# Patient Record
Sex: MALE | Race: BLACK OR AFRICAN AMERICAN | NOT HISPANIC OR LATINO
[De-identification: names, ages, dates, MRNs, and addresses within clinical notes are randomized per-mention and may not be internally consistent; named-entity substitution may affect disease eponyms.]

---

## 2019-10-22 ENCOUNTER — TRANSCRIPTION ENCOUNTER (OUTPATIENT)
Age: 39
End: 2019-10-22

## 2019-10-24 ENCOUNTER — APPOINTMENT (OUTPATIENT)
Dept: ORTHOPEDIC SURGERY | Facility: CLINIC | Age: 39
End: 2019-10-24
Payer: COMMERCIAL

## 2019-10-24 VITALS
DIASTOLIC BLOOD PRESSURE: 86 MMHG | HEIGHT: 70 IN | BODY MASS INDEX: 31.5 KG/M2 | WEIGHT: 220 LBS | HEART RATE: 65 BPM | SYSTOLIC BLOOD PRESSURE: 126 MMHG

## 2019-10-24 DIAGNOSIS — Z98.890 OTHER SPECIFIED POSTPROCEDURAL STATES: ICD-10-CM

## 2019-10-24 DIAGNOSIS — M75.21 BICIPITAL TENDINITIS, RIGHT SHOULDER: ICD-10-CM

## 2019-10-24 DIAGNOSIS — M75.81 OTHER SHOULDER LESIONS, RIGHT SHOULDER: ICD-10-CM

## 2019-10-24 DIAGNOSIS — M89.511 OSTEOLYSIS, RIGHT SHOULDER: ICD-10-CM

## 2019-10-24 PROCEDURE — 73030 X-RAY EXAM OF SHOULDER: CPT | Mod: RT

## 2019-10-24 PROCEDURE — 99203 OFFICE O/P NEW LOW 30 MIN: CPT

## 2019-10-24 RX ORDER — MELOXICAM 7.5 MG/1
7.5 TABLET ORAL
Qty: 21 | Refills: 0 | Status: COMPLETED | COMMUNITY
Start: 2019-10-24 | End: 2019-11-14

## 2019-10-24 NOTE — DISCUSSION/SUMMARY
[de-identified] : DAGO is a 39 year male who presents today with complaints of right shoulder pain x 1 month. Prior to this he has had no pain but does admit to right shoulder surgery by unknown doctor for impingement 7 years ago. At that time he did PT but hasn't done any since then. Over the last 2 weeks he has rested and not performed any gym activities, 2 weeks prior when the pain started there was no known injury with workouts at the gym but he modified his activities due to pain. He denies use of pain medication or injection for this issue. He was seen by 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 A prescription of Meloxicam x 21 days was prescribed and should be taken as directed with food to prevent GI upset, if occurs pt to D/C and call us at that time. Pt to perform PT as well 2x/week x 4-6 weeks and until we see him again. At that time we will see him for clinical reassessment. Pt agrees to the above plan and all questions were answered.\par

## 2019-10-24 NOTE — HISTORY OF PRESENT ILLNESS
[de-identified] : DGAO is a 39 year male who presents today with complaints of right shoulder pain x 1 month. Prior to this he has had no pain but does admit to right shoulder surgery by unknown doctor for impingement 7 years ago. At that time he did PT but hasn't done any since then. Over the last 2 weeks he has rested and not performed any gym activities, 2 weeks prior when the pain started there was no known injury with workouts at the gym but he modified his activities due to pain. He denies use of pain medication or injection for this issue. He was seen by [facility] who performed 3 view Xrays without fracture and referred him to us.

## 2019-10-24 NOTE — PHYSICAL EXAM
[de-identified] : Physical Exam:\par General: Well appearing, no acute distress\par Neurologic: A&Ox3, No focal deficits\par Head: NCAT without abrasions, lacerations, or ecchymosis to head, face, or scalp\par Eyes: No scleral icterus, no gross abnormalities\par Respiratory: Equal chest wall expansion bilaterally, no accessory muscle use\par Lymphatic: No lymphadenopathy palpated\par Skin: Warm and dry\par Psychiatric: Normal mood and affect\par \par Right Shoulder\par ·	Inspection/Palpation: AC joint tenderness, no swelling or deformities\par ·	Range of Motion: no crepitus with ROM; Active/Passive FF 0-170; ER at side: active  0-30, passive 0-35 with pain and weakness; IR to L1 with pain\par ·	Strength: forward elevation in scapular plane [4/5], internal rotation [4/5], external rotation [4/5], adduction [4/5] and abduction [4/5]\par ·	Stability: no joint instability on provocative testing\par ·	Tests: Christensen test negative, Neer negative, drop arm test negative, bear hug test negative, Napolean sign negative, cross arm adduction negative, lift off sign negative, hornblowers sign negative, speeds test NEG, Yergason's test NEG, no bicipital groove tenderness, Gonzalez's Active Compression test POS, whipple test NEG, bicep's load II test negative\par \par Left Shoulder\par ·	Inspection/Palpation: no tenderness, swelling or deformities\par ·	Range of Motion: full and painless in all planes, no crepitus\par ·	Strength: forward elevation in scapular plane 5/5, internal rotation 5/5, external rotation 5/5, adduction 5/5 and abduction 5/5\par ·	Stability: no joint instability on provocative testing\par ·	Tests: Christensen test negative, Neer sign negative, negative drop arm test secondary to pain, bear hug test negative, Napolean sign negative, cross arm adduction negative, lift off sign positive, hornblowers sign negative, speeds test negative, Yergason's test negative, no bicipital groove tenderness, Gonzalez's Active Compression test negative [de-identified] : The following radiograph was ordered and read by me during this patients visit. I reviewed this radiograph, alongside 3 other views obtained by [facility], in detail with the patient and discussed the findings as highlighted below. \par \par 4 views of the right shoulder show no fracture, dislocation or bony lesions. There is no evidence of degenerative change in the glenohumeral and acromioclavicular joints with maintenance of the joint space. Otherwise unremarkable.

## 2019-11-27 ENCOUNTER — OTHER (OUTPATIENT)
Age: 39
End: 2019-11-27

## 2019-12-10 ENCOUNTER — APPOINTMENT (OUTPATIENT)
Dept: ORTHOPEDIC SURGERY | Facility: CLINIC | Age: 39
End: 2019-12-10

## 2020-07-19 ENCOUNTER — TRANSCRIPTION ENCOUNTER (OUTPATIENT)
Age: 40
End: 2020-07-19

## 2022-11-10 ENCOUNTER — NON-APPOINTMENT (OUTPATIENT)
Age: 42
End: 2022-11-10

## 2022-11-12 ENCOUNTER — APPOINTMENT (OUTPATIENT)
Dept: ORTHOPEDIC SURGERY | Facility: CLINIC | Age: 42
End: 2022-11-12

## 2022-11-12 VITALS
HEIGHT: 70 IN | SYSTOLIC BLOOD PRESSURE: 144 MMHG | DIASTOLIC BLOOD PRESSURE: 85 MMHG | WEIGHT: 235 LBS | HEART RATE: 79 BPM | BODY MASS INDEX: 33.64 KG/M2 | TEMPERATURE: 98.1 F

## 2022-11-12 DIAGNOSIS — R29.898 OTHER SYMPTOMS AND SIGNS INVOLVING THE MUSCULOSKELETAL SYSTEM: ICD-10-CM

## 2022-11-12 DIAGNOSIS — M25.612 STIFFNESS OF LEFT SHOULDER, NOT ELSEWHERE CLASSIFIED: ICD-10-CM

## 2022-11-12 DIAGNOSIS — M24.812 OTHER SPECIFIC JOINT DERANGEMENTS OF LEFT SHOULDER, NOT ELSEWHERE CLASSIFIED: ICD-10-CM

## 2022-11-12 PROCEDURE — 99203 OFFICE O/P NEW LOW 30 MIN: CPT

## 2022-11-12 PROCEDURE — 73030 X-RAY EXAM OF SHOULDER: CPT | Mod: LT

## 2022-11-12 RX ORDER — METHYLPREDNISOLONE 4 MG/1
4 TABLET ORAL
Qty: 1 | Refills: 0 | Status: ACTIVE | COMMUNITY
Start: 2022-11-12 | End: 1900-01-01

## 2022-11-12 NOTE — HISTORY OF PRESENT ILLNESS
[de-identified] : The patient is a 43 yo male who presents with left shoulder pain for the past 48 hours.  He had no trauma to the left shoulder but states that 2 mornings ago, he woke up with severe left shoulder pain and stiffness.  Pain scale in office with trying to move the shoulder is a 10/10, at rest 5/10.  The pain is sharp and stabbing.  No numbness or tingling going down his arm.  Severe weakness of the left shoulder.  He tries to move the shoulder but cannot due to pain and stiffness/weakness.  Patient is extremely concerned at this time. Patient went to an urgent care yesterday where they started him on Naproxen and cyclobenzaprine.

## 2022-11-12 NOTE — DISCUSSION/SUMMARY
[de-identified] : At this time elect to go ahead and order an MRI of the left shoulder without contrast to evaluate for rotator cuff tear.  Patient has severe weakness and stiffness of the shoulder.  I prescribed him a Medrol Dosepak and he will continue to use his muscle relaxer for nighttime to help him sleep.  I do want the patient to start outpatient physical therapy for range of motion.  Once the MRI is completed I will review with him over the phone.  If the MRI is positive consider sports medicine follow-up.  All of his questions were answered and he understood the treatment course.

## 2022-11-12 NOTE — PHYSICAL EXAM
[de-identified] : Laterality: Left shoulder\par \par General: Alert and oriented x3.  In no acute distress.  Pleasant in nature with a normal affect.  No apparent respiratory distress. \par Erythema, Warmth, Rubor: Negative\par Swelling: Negative\par \par ROM: Severe range of motion deficits in all planes of the left shoulder with severe pain.\par FF: 90 degrees degrees\par ER: 65 degrees\par Abduction: 90 degrees\par IR: Normal\par IR behind back: Left gluteal\par \par Special Test:\par Empty Can Sign: Positive with a positive drop arm sign with severe weakness in the scapular plane.\par Drayton's Sign: Positive\par Christensen/Neer Sign: Negative\par Speed's Sign: Negative\par Yergason's Sign: Negative\par Apprehension/Relocation: Negative\par Sulcus Sign: Negative\par Cross Arm Adduction/Pain over AC-J: Negative\par Belly Press/Lift Off Behind Back: Negative\par \par Neurovascularly intact motor and sensory [de-identified] : X-rays left shoulder reviewed, 11/12/2022: No abnormality seen.

## 2022-11-18 PROBLEM — R29.898 SHOULDER WEAKNESS: Status: ACTIVE | Noted: 2022-11-12

## 2022-11-18 PROBLEM — M25.612 STIFFNESS OF LEFT SHOULDER JOINT: Status: ACTIVE | Noted: 2022-11-12

## 2022-11-23 ENCOUNTER — OUTPATIENT (OUTPATIENT)
Dept: OUTPATIENT SERVICES | Facility: HOSPITAL | Age: 42
LOS: 1 days | End: 2022-11-23

## 2022-11-23 ENCOUNTER — APPOINTMENT (OUTPATIENT)
Dept: MRI IMAGING | Facility: CLINIC | Age: 42
End: 2022-11-23

## 2022-11-23 DIAGNOSIS — M24.812 OTHER SPECIFIC JOINT DERANGEMENTS OF LEFT SHOULDER, NOT ELSEWHERE CLASSIFIED: ICD-10-CM

## 2022-11-23 DIAGNOSIS — Z98.89 OTHER SPECIFIED POSTPROCEDURAL STATES: Chronic | ICD-10-CM

## 2022-11-23 DIAGNOSIS — M25.612 STIFFNESS OF LEFT SHOULDER, NOT ELSEWHERE CLASSIFIED: ICD-10-CM

## 2022-11-23 DIAGNOSIS — R29.898 OTHER SYMPTOMS AND SIGNS INVOLVING THE MUSCULOSKELETAL SYSTEM: ICD-10-CM

## 2022-11-23 DIAGNOSIS — M25.512 PAIN IN LEFT SHOULDER: ICD-10-CM

## 2022-11-23 PROCEDURE — 73221 MRI JOINT UPR EXTREM W/O DYE: CPT | Mod: 26,LT

## 2022-12-02 ENCOUNTER — APPOINTMENT (OUTPATIENT)
Dept: ORTHOPEDIC SURGERY | Facility: CLINIC | Age: 42
End: 2022-12-02

## 2022-12-02 DIAGNOSIS — M75.22 BICIPITAL TENDINITIS, LEFT SHOULDER: ICD-10-CM

## 2022-12-02 PROCEDURE — 99446 NTRPROF PH1/NTRNET/EHR 5-10: CPT

## 2023-12-17 ENCOUNTER — NON-APPOINTMENT (OUTPATIENT)
Age: 43
End: 2023-12-17

## 2024-05-02 ENCOUNTER — EMERGENCY (EMERGENCY)
Facility: HOSPITAL | Age: 44
LOS: 1 days | Discharge: HOME | End: 2024-05-02
Attending: EMERGENCY MEDICINE
Payer: COMMERCIAL

## 2024-05-02 VITALS
OXYGEN SATURATION: 98 % | HEART RATE: 71 BPM | SYSTOLIC BLOOD PRESSURE: 145 MMHG | WEIGHT: 230.16 LBS | DIASTOLIC BLOOD PRESSURE: 79 MMHG | HEIGHT: 70 IN | TEMPERATURE: 98 F | RESPIRATION RATE: 18 BRPM

## 2024-05-02 DIAGNOSIS — Z98.89 OTHER SPECIFIED POSTPROCEDURAL STATES: Chronic | ICD-10-CM

## 2024-05-02 PROCEDURE — 73700 CT LOWER EXTREMITY W/O DYE: CPT | Mod: MC

## 2024-05-02 PROCEDURE — 99284 EMERGENCY DEPT VISIT MOD MDM: CPT | Mod: 25

## 2024-05-02 PROCEDURE — 96372 THER/PROPH/DIAG INJ SC/IM: CPT

## 2024-05-02 PROCEDURE — 99284 EMERGENCY DEPT VISIT MOD MDM: CPT

## 2024-05-02 PROCEDURE — 73700 CT LOWER EXTREMITY W/O DYE: CPT | Mod: 26,LT,MC

## 2024-05-02 PROCEDURE — 73562 X-RAY EXAM OF KNEE 3: CPT | Mod: 26,LT

## 2024-05-02 PROCEDURE — 73562 X-RAY EXAM OF KNEE 3: CPT

## 2024-05-02 RX ORDER — KETOROLAC TROMETHAMINE 30 MG/ML
30 SYRINGE (ML) INJECTION ONCE
Refills: 0 | Status: DISCONTINUED | OUTPATIENT
Start: 2024-05-02 | End: 2024-05-02

## 2024-05-02 RX ADMIN — Medication 30 MILLIGRAM(S): at 09:32

## 2024-05-02 NOTE — ED PROVIDER NOTE - NSFOLLOWUPINSTRUCTIONS_ED_ALL_ED_FT
REST   ICE   ELEVATE   alternate Tylenol extra strength 2 tablets ever 8 hours and IBUprofen 600-800mg every 6-8 hours for pain control     please contact orthopedics MD NETT for further evaluation and care   new or worsening condition return to the ED

## 2024-05-02 NOTE — ED ADULT NURSE NOTE - OBJECTIVE STATEMENT
Pt presenting to Emergency Department complaining L Knee pain x 2 days. Pt reports twisting L leg in which pain began. Reports history of ACL repair 2 years ago on L leg. Denies chest pain, SOB, abd pain, back pain, headaches, dizziness, lightheadedness, fevers, chills, nausea, vomiting, diarrhea, constipation and dysuria. Respirations even and unlabored.

## 2024-05-02 NOTE — ED PROVIDER NOTE - CLINICAL SUMMARY MEDICAL DECISION MAKING FREE TEXT BOX
Pt with hx ACL surgery L knee and pain after twisting injury 2 days ago  surg 2022  pe pain w rom  plan meds for comfort and imaging Pt with hx ACL surgery L knee and pain after twisting injury 2 days ago  surg 2022  pe pain w rom  plan meds for comfort and imaging    MANNY SHORT: small effusion on CT. ambulatory. pain controlled. advised on outpt fu with orthopedics ( has followed with MD Thomas in past). advised on RICE and return precautions

## 2024-05-02 NOTE — ED PROVIDER NOTE - CARE PROVIDER_API CALL
Walker Thomas  Orthopaedic Surgery  [address] [place] [id number]  Phone: [phone]  Fax: [phone]  Follow Up Time:

## 2024-05-02 NOTE — ED PROVIDER NOTE - OBJECTIVE STATEMENT
42 yo male 2 years s/p acl surgery L knee  presents with pain after twisting injury 2 days ago  difficulty w straightening leg  no fever redeness + swelling + pain medial to patella  soc no cig or alc  med hx htn hld

## 2024-05-02 NOTE — ED PROVIDER NOTE - ATTENDING APP SHARED VISIT CONTRIBUTION OF CARE
I performed the initial face to face bedside interview with this patient regarding history of present illness, review of symptoms and past medical, social and family history.  I completed an independent physical examination.  I was the initial provider who evaluated this patient.  The history, review of symptoms and examination was documented by me and I attest to the accuracy of the documentation.  I have signed out the follow up of any pending tests (i.e. labs, radiological studies) to the PA.  I have discussed the patient’s plan of care and disposition with the PA.  Pt with hx ACL surgery L knee and pain after twisting injury 2 days ago  surg 2022  pe pain w rom  plan meds for comfort and imaging

## 2024-05-02 NOTE — ED PROVIDER NOTE - PHYSICAL EXAMINATION
General: well appearing, NAD  Head:  NC, AT  Eyes: EOMI, no scleral icterus  Mouth: MMM  MSK/Vascular: full ROM but pain w ROM L knee, soft compartments, warm extremities neg A/P drawer test not tender med or lat meniscus or collateral ligaments not tender  infra or supra patella ligs  Neuro: AAOx3, sensation to light touch intact, finger to nose coordination intact, cranial nerves 2-12 intact  Psych: calm, cooperative, normal affect

## 2024-05-02 NOTE — ED PROVIDER NOTE - PROGRESS NOTE DETAILS
MANNY SHORT : PT evaluated by intake physician. HPI/PE/ROS as noted above. Will follow up plan per intake physician   small effusion on ct no acute bony abnormality  followed with MD denise in past, advised on RICE and outpt fu with orthopedics as well as return precautions   verbalizes understanding

## 2024-05-02 NOTE — ED PROVIDER NOTE - NS ED ROS FT
Constitutional: no fever, no chills    MSK: + msk pain, no weakness  Skin: no lesions, and no rashes   Neuro: no LOC, no headache, no sensory deficits, and no weakness

## 2024-05-02 NOTE — ED PROVIDER NOTE - PATIENT PORTAL LINK FT
You can access the FollowMyHealth Patient Portal offered by [facility] by registering at the following website: [email or web address]. By joining [employer]’s FollowMyHealth portal, you will also be able to view your health information using other applications (apps) compatible with our system.

## 2024-05-03 ENCOUNTER — APPOINTMENT (OUTPATIENT)
Dept: ORTHOPEDIC SURGERY | Facility: CLINIC | Age: 44
End: 2024-05-03
Payer: COMMERCIAL

## 2024-05-03 VITALS
HEART RATE: 83 BPM | SYSTOLIC BLOOD PRESSURE: 136 MMHG | HEIGHT: 70 IN | WEIGHT: 235 LBS | BODY MASS INDEX: 33.64 KG/M2 | DIASTOLIC BLOOD PRESSURE: 77 MMHG

## 2024-05-03 DIAGNOSIS — M25.562 PAIN IN LEFT KNEE: ICD-10-CM

## 2024-05-03 PROCEDURE — 99203 OFFICE O/P NEW LOW 30 MIN: CPT

## 2024-05-03 NOTE — DISCUSSION/SUMMARY
[de-identified] : Assessment: Patient is a 43-year-old male status post ACL reconstruction in 2016 with possible new onset medial meniscus tear  Plan: I had a long discussion with the patient today regarding the nature of their diagnosis and treatment plan. We discussed the risks and benefits of no treatment as well as nonoperative and operative treatments.  I reviewed the x-rays that showed no acute bony pathology.  On examination today he has a joint effusion with tenderness on the medial joint line indicating a possible medial meniscus injury given his acute traumatic injury yesterday.  At this time I am recommending ice, heat, rest, over-the-counter anti-inflammatories, avoiding exacerbating activities.  I also recommended an MRI of the left knee for evaluation of internal derangement of the medial meniscus.  He will follow-up after the MRI is complete for repeat evaluation and potential surgical discussion.  Patient seen and examined with Dr. Chavez today.   The patient verbalizes their understanding and agrees with the plan.  All questions were answered to their satisfaction.

## 2024-05-03 NOTE — PHYSICAL EXAM
[de-identified] : General: Awake, alert, no acute distress, Patient was cooperative and appropriate during the examination.  The patient is of normal weight for height and age.  Walks without an antalgic gait.  Left   Knee Examination: Physical examination of the knee demonstrates normal skin without signs of skin changes or abnormalities. No erythema, warmth, with a moderate joint effusion noted and no evidence of infection.   Sensation is intact to light touch L2-S1 Palpable DP/PT pulse EHL/FHL/TA/GSC motor function intact   Range of Motion 0-125 with pain at the terminal degrees of flexion and extension   Strength Testing Quadriceps 5/5 Hamstring 5/5 Patient is able to perform a straight leg raise without difficulty.   Palpation Not tender to palpation about the distal femur, proximal tibia, or patella No palpable defect appreciated in the quadriceps or patellar tendons Exquisitely tender to palpation of medial joint line Mildly tender to palpation of lateral joint line Nor tender to palpation in the patella femoral region   Special Tests Anterior Drawer negative Posterior Drawer negative Lachman Exam negative No Varus or Valgus Laxity at 0 or 30 degrees of knee flexion  Елена's Test positive medially Apley grind test positive medially  Active compression of the patella negative Lateral apprehension test negative Translation of the patella 2 quadrants with a firm endpoint Dial test negative [de-identified] : X-rays 3 views of the left knee taken 5/2/2024 at the hospital shows no acute fracture or dislocation with previous metallic screws in good position well aligned with no evidence of loosening from previous ACL reconstruction.  There is no significant arthritic changes noted.

## 2024-05-03 NOTE — HISTORY OF PRESENT ILLNESS
[de-identified] : 05/03/2024 : DAGO BLAKE  is a 43 year  old male who presents to the office for evaluation of his left knee.  He states he had a previous ACL reconstruction with Dr. Thomas back in 2016 and did well after the surgery with no issues.  He states he had clicking and popping in the knee for years that even predates the ACL reconstruction but he did well after the surgery with no issues until yesterday when he was pivoting from a standing position and felt acute popping sensation different than his normal popping over the medial aspect of the knee and since then he has had swelling and inability to fully straighten the knee with weightbearing because of extreme pain.  He went to the ER and had x-rays taken that showed no acute pathology and was told to follow-up with orthopedics.  He is here for specialist opinion.  He denies any numbness or tingling distally.  He has no other complaints today.

## 2024-05-13 ENCOUNTER — OUTPATIENT (OUTPATIENT)
Dept: OUTPATIENT SERVICES | Facility: HOSPITAL | Age: 44
LOS: 1 days | End: 2024-05-13
Payer: COMMERCIAL

## 2024-05-13 ENCOUNTER — TRANSCRIPTION ENCOUNTER (OUTPATIENT)
Age: 44
End: 2024-05-13

## 2024-05-13 ENCOUNTER — APPOINTMENT (OUTPATIENT)
Dept: MRI IMAGING | Facility: CLINIC | Age: 44
End: 2024-05-13
Payer: COMMERCIAL

## 2024-05-13 DIAGNOSIS — M25.562 PAIN IN LEFT KNEE: ICD-10-CM

## 2024-05-13 DIAGNOSIS — Z98.89 OTHER SPECIFIED POSTPROCEDURAL STATES: Chronic | ICD-10-CM

## 2024-05-13 PROCEDURE — 73721 MRI JNT OF LWR EXTRE W/O DYE: CPT

## 2024-05-13 PROCEDURE — 73721 MRI JNT OF LWR EXTRE W/O DYE: CPT | Mod: 26,LT

## 2024-05-24 ENCOUNTER — APPOINTMENT (OUTPATIENT)
Dept: ORTHOPEDIC SURGERY | Facility: CLINIC | Age: 44
End: 2024-05-24
Payer: COMMERCIAL

## 2024-05-24 VITALS
BODY MASS INDEX: 33.64 KG/M2 | DIASTOLIC BLOOD PRESSURE: 83 MMHG | HEIGHT: 70 IN | HEART RATE: 69 BPM | WEIGHT: 235 LBS | SYSTOLIC BLOOD PRESSURE: 143 MMHG

## 2024-05-24 DIAGNOSIS — M25.512 PAIN IN LEFT SHOULDER: ICD-10-CM

## 2024-05-24 PROCEDURE — 99214 OFFICE O/P EST MOD 30 MIN: CPT

## 2024-05-24 NOTE — DISCUSSION/SUMMARY
[de-identified] : Assessment: 43-year-old male status post ACL reconstruction in 2016 with acute bucket-handle medial meniscus tear  Plan: I had a long discussion with the patient today regarding the nature of their diagnosis and treatment plan.  I reviewed the patient's imaging with them today in the office which demonstrates an acute bucket-handle tear of the medial meniscus status post ACL reconstruction with an intact ACL graft and minimal arthritis.. We discussed the risks and benefits of no treatment as well as nonoperative and operative treatments. Nonoperative treatment would include resting, icing, heating, stretching, anti-inflammatory medicines as needed, activity/lifestyle modifications, physical therapy, possible cortisone injections, and a gradual return to activities as tolerated.  The benefits of nonsurgical treatment are that it avoids the risks and rehabilitation associated with surgery.  The disadvantages of nonsurgical treatment are that it may not completely alleviate the patient's symptoms.   Surgical treatment would include a left knee arthroscopy with partial medial meniscectomy, chondroplasty, and synovectomy.  The risks and benefits of surgery were discussed in detail.  The benefits include improved knee pain and function.  The risks include but are not limited to infection, blood loss, blood clots, neurovascular injury, stiffness/loss of range of motion, fracture, progressive arthritis, persistent pain, re-tearing of the meniscus, anesthesia related complications including paralysis and death, and the need for further surgery in the future.  The patient understands that they will be weightbearing as tolerated on crutches for assistance with ambulation following the surgery.  Physical therapy will be recommended in order to optimize the patient's knee function postoperatively.  I expect most patients to recover within 2 to 3 months after this procedure although some patients may take longer to fully recover.  Noncompliance with any postoperative restrictions and/or participation in physical therapy could lead to a suboptimal outcome or surgical failure.  The patient verbalizes their understanding of the surgical risks as well as the anticipated postoperative course.   They would like to proceed with surgery at this time. They will speak with my surgical coordinator regarding presurgical testing as well as scheduling the procedure.    The patient verbalizes understanding and agrees with the plan.  All questions were answered to their satisfaction.

## 2024-05-24 NOTE — HISTORY OF PRESENT ILLNESS
[de-identified] : 5/24/2024: Brigido is a pleasant 43-year-old male returns to the office today for reassessment of his left knee pain.  The patient states his symptoms are unchanged since his last appointment.  He recently had an MRI of the knee and comes in to discuss results and any further recommendations.  The patient denies any fevers, chills, sweats, recent illnesses, numbness, tingling, weakness, or pain elsewhere at this time.  05/03/2024 : BRIGIDO BLAKE  is a 43 year  old male who presents to the office for evaluation of his left knee.  He states he had a previous ACL reconstruction with Dr. Thomas back in 2016 and did well after the surgery with no issues.  He states he had clicking and popping in the knee for years that even predates the ACL reconstruction but he did well after the surgery with no issues until yesterday when he was pivoting from a standing position and felt acute popping sensation different than his normal popping over the medial aspect of the knee and since then he has had swelling and inability to fully straighten the knee with weightbearing because of extreme pain.  He went to the ER and had x-rays taken that showed no acute pathology and was told to follow-up with orthopedics.  He is here for specialist opinion.  He denies any numbness or tingling distally.  He has no other complaints today.

## 2024-05-24 NOTE — PHYSICAL EXAM
[de-identified] : General: Awake, alert, no acute distress, Patient was cooperative and appropriate during the examination.  The patient is of normal weight for height and age.  Walks without an antalgic gait.  Left   Knee Examination: Physical examination of the knee demonstrates normal skin without signs of skin changes or abnormalities. No erythema, warmth, with a mild to moderate joint effusion noted and no evidence of infection.   Sensation is intact to light touch L2-S1 Palpable DP/PT pulse EHL/FHL/TA/GSC motor function intact   Range of Motion 0-125 with pain at the terminal degrees of flexion and extension   Strength Testing Quadriceps 5/5 Hamstring 5/5 Patient is able to perform a straight leg raise without difficulty.   Palpation Not tender to palpation about the distal femur, proximal tibia, or patella No palpable defect appreciated in the quadriceps or patellar tendons Exquisitely tender to palpation of medial joint line Mildly tender to palpation of lateral joint line Nor tender to palpation in the patella femoral region   Special Tests Anterior Drawer negative Posterior Drawer negative Lachman Exam negative No Varus or Valgus Laxity at 0 or 30 degrees of knee flexion  Елена's Test positive medially Apley grind test positive medially  Active compression of the patella negative Lateral apprehension test negative Translation of the patella 2 quadrants with a firm endpoint Dial test negative [de-identified] : MRI of the left knee from a [facility] on 5/13/2024 was reviewed the patient today in the office: -Bucket-handle type tear of the medial meniscus, as above. -Intact ACL graft. -Lateral and patellofemoral compartment chondrosis, as above. -Large joint effusion  X-rays 3 views of the left knee taken 5/2/2024 at the hospital shows no acute fracture or dislocation with previous metallic screws in good position well aligned with no evidence of loosening from previous ACL reconstruction.  There is no significant arthritic changes noted.

## 2024-05-30 ENCOUNTER — NON-APPOINTMENT (OUTPATIENT)
Age: 44
End: 2024-05-30

## 2024-06-07 ENCOUNTER — OUTPATIENT (OUTPATIENT)
Dept: OUTPATIENT SERVICES | Facility: HOSPITAL | Age: 44
LOS: 1 days | End: 2024-06-07
Payer: COMMERCIAL

## 2024-06-07 DIAGNOSIS — Z98.89 OTHER SPECIFIED POSTPROCEDURAL STATES: Chronic | ICD-10-CM

## 2024-06-07 DIAGNOSIS — Z01.818 ENCOUNTER FOR OTHER PREPROCEDURAL EXAMINATION: ICD-10-CM

## 2024-06-07 LAB
A1C WITH ESTIMATED AVERAGE GLUCOSE RESULT: 6.1 % — HIGH (ref 4–5.6)
ALBUMIN SERPL ELPH-MCNC: 4.6 G/DL — SIGNIFICANT CHANGE UP (ref 3.3–5.2)
ALP SERPL-CCNC: 102 U/L — SIGNIFICANT CHANGE UP (ref 40–120)
ALT FLD-CCNC: 36 U/L — SIGNIFICANT CHANGE UP
ANION GAP SERPL CALC-SCNC: 13 MMOL/L — SIGNIFICANT CHANGE UP (ref 5–17)
AST SERPL-CCNC: 31 U/L — SIGNIFICANT CHANGE UP
BASOPHILS # BLD AUTO: 0.03 K/UL — SIGNIFICANT CHANGE UP (ref 0–0.2)
BASOPHILS NFR BLD AUTO: 0.4 % — SIGNIFICANT CHANGE UP (ref 0–2)
BILIRUB SERPL-MCNC: 0.5 MG/DL — SIGNIFICANT CHANGE UP (ref 0.4–2)
BUN SERPL-MCNC: 10 MG/DL — SIGNIFICANT CHANGE UP (ref 8–20)
CALCIUM SERPL-MCNC: 9.8 MG/DL — SIGNIFICANT CHANGE UP (ref 8.4–10.5)
CHLORIDE SERPL-SCNC: 103 MMOL/L — SIGNIFICANT CHANGE UP (ref 96–108)
CO2 SERPL-SCNC: 26 MMOL/L — SIGNIFICANT CHANGE UP (ref 22–29)
CREAT SERPL-MCNC: 0.92 MG/DL — SIGNIFICANT CHANGE UP (ref 0.5–1.3)
EGFR: 105 ML/MIN/1.73M2 — SIGNIFICANT CHANGE UP
EOSINOPHIL # BLD AUTO: 0.23 K/UL — SIGNIFICANT CHANGE UP (ref 0–0.5)
EOSINOPHIL NFR BLD AUTO: 3.4 % — SIGNIFICANT CHANGE UP (ref 0–6)
ESTIMATED AVERAGE GLUCOSE: 128 MG/DL — HIGH (ref 68–114)
GLUCOSE SERPL-MCNC: 86 MG/DL — SIGNIFICANT CHANGE UP (ref 70–99)
HCT VFR BLD CALC: 43.7 % — SIGNIFICANT CHANGE UP (ref 39–50)
HGB BLD-MCNC: 14.4 G/DL — SIGNIFICANT CHANGE UP (ref 13–17)
IMM GRANULOCYTES NFR BLD AUTO: 0.3 % — SIGNIFICANT CHANGE UP (ref 0–0.9)
LYMPHOCYTES # BLD AUTO: 2.51 K/UL — SIGNIFICANT CHANGE UP (ref 1–3.3)
LYMPHOCYTES # BLD AUTO: 37.2 % — SIGNIFICANT CHANGE UP (ref 13–44)
MCHC RBC-ENTMCNC: 30.6 PG — SIGNIFICANT CHANGE UP (ref 27–34)
MCHC RBC-ENTMCNC: 33 GM/DL — SIGNIFICANT CHANGE UP (ref 32–36)
MCV RBC AUTO: 92.8 FL — SIGNIFICANT CHANGE UP (ref 80–100)
MONOCYTES # BLD AUTO: 0.44 K/UL — SIGNIFICANT CHANGE UP (ref 0–0.9)
MONOCYTES NFR BLD AUTO: 6.5 % — SIGNIFICANT CHANGE UP (ref 2–14)
NEUTROPHILS # BLD AUTO: 3.51 K/UL — SIGNIFICANT CHANGE UP (ref 1.8–7.4)
NEUTROPHILS NFR BLD AUTO: 52.2 % — SIGNIFICANT CHANGE UP (ref 43–77)
PLATELET # BLD AUTO: 247 K/UL — SIGNIFICANT CHANGE UP (ref 150–400)
POTASSIUM SERPL-MCNC: 4.8 MMOL/L — SIGNIFICANT CHANGE UP (ref 3.5–5.3)
POTASSIUM SERPL-SCNC: 4.8 MMOL/L — SIGNIFICANT CHANGE UP (ref 3.5–5.3)
PROT SERPL-MCNC: 7.4 G/DL — SIGNIFICANT CHANGE UP (ref 6.6–8.7)
RBC # BLD: 4.71 M/UL — SIGNIFICANT CHANGE UP (ref 4.2–5.8)
RBC # FLD: 12.7 % — SIGNIFICANT CHANGE UP (ref 10.3–14.5)
SODIUM SERPL-SCNC: 142 MMOL/L — SIGNIFICANT CHANGE UP (ref 135–145)
WBC # BLD: 6.74 K/UL — SIGNIFICANT CHANGE UP (ref 3.8–10.5)
WBC # FLD AUTO: 6.74 K/UL — SIGNIFICANT CHANGE UP (ref 3.8–10.5)

## 2024-06-07 PROCEDURE — 85025 COMPLETE CBC W/AUTO DIFF WBC: CPT

## 2024-06-07 PROCEDURE — G0463: CPT

## 2024-06-07 PROCEDURE — 83036 HEMOGLOBIN GLYCOSYLATED A1C: CPT

## 2024-06-07 PROCEDURE — 36415 COLL VENOUS BLD VENIPUNCTURE: CPT

## 2024-06-07 PROCEDURE — 80053 COMPREHEN METABOLIC PANEL: CPT

## 2024-06-17 RX ORDER — ASPIRIN 325 MG/1
325 TABLET, FILM COATED ORAL DAILY
Qty: 14 | Refills: 0 | Status: ACTIVE | COMMUNITY
Start: 2024-06-17 | End: 1900-01-01

## 2024-06-17 RX ORDER — OXYCODONE 5 MG/1
5 TABLET ORAL
Qty: 28 | Refills: 0 | Status: ACTIVE | COMMUNITY
Start: 2024-06-17 | End: 1900-01-01

## 2024-06-18 ENCOUNTER — APPOINTMENT (OUTPATIENT)
Dept: ORTHOPEDIC SURGERY | Facility: AMBULATORY SURGERY CENTER | Age: 44
End: 2024-06-18
Payer: COMMERCIAL

## 2024-06-18 PROCEDURE — 29881 ARTHRS KNE SRG MNISECTMY M/L: CPT | Mod: AS,LT

## 2024-06-18 PROCEDURE — 29881 ARTHRS KNE SRG MNISECTMY M/L: CPT | Mod: LT

## 2024-06-28 ENCOUNTER — NON-APPOINTMENT (OUTPATIENT)
Age: 44
End: 2024-06-28

## 2024-07-02 ENCOUNTER — APPOINTMENT (OUTPATIENT)
Dept: ORTHOPEDIC SURGERY | Facility: CLINIC | Age: 44
End: 2024-07-02
Payer: COMMERCIAL

## 2024-07-02 VITALS
DIASTOLIC BLOOD PRESSURE: 94 MMHG | HEIGHT: 70 IN | WEIGHT: 235 LBS | SYSTOLIC BLOOD PRESSURE: 152 MMHG | BODY MASS INDEX: 33.64 KG/M2 | HEART RATE: 73 BPM

## 2024-07-02 DIAGNOSIS — Z98.890 OTHER SPECIFIED POSTPROCEDURAL STATES: ICD-10-CM

## 2024-07-02 PROCEDURE — 99024 POSTOP FOLLOW-UP VISIT: CPT

## 2024-07-04 ENCOUNTER — EMERGENCY (EMERGENCY)
Facility: HOSPITAL | Age: 44
LOS: 1 days | Discharge: HOME | End: 2024-07-04
Attending: EMERGENCY MEDICINE
Payer: COMMERCIAL

## 2024-07-04 VITALS
HEART RATE: 81 BPM | RESPIRATION RATE: 18 BRPM | WEIGHT: 235.01 LBS | OXYGEN SATURATION: 97 % | TEMPERATURE: 98 F | SYSTOLIC BLOOD PRESSURE: 182 MMHG | DIASTOLIC BLOOD PRESSURE: 96 MMHG

## 2024-07-04 VITALS
SYSTOLIC BLOOD PRESSURE: 144 MMHG | TEMPERATURE: 98 F | DIASTOLIC BLOOD PRESSURE: 88 MMHG | RESPIRATION RATE: 16 BRPM | HEART RATE: 63 BPM | OXYGEN SATURATION: 99 %

## 2024-07-04 DIAGNOSIS — Z98.89 OTHER SPECIFIED POSTPROCEDURAL STATES: Chronic | ICD-10-CM

## 2024-07-04 LAB
ALBUMIN SERPL ELPH-MCNC: 4.4 G/DL — SIGNIFICANT CHANGE UP (ref 3.3–5.2)
ALP SERPL-CCNC: 106 U/L — SIGNIFICANT CHANGE UP (ref 40–120)
ALT FLD-CCNC: 51 U/L — HIGH
ANION GAP SERPL CALC-SCNC: 15 MMOL/L — SIGNIFICANT CHANGE UP (ref 5–17)
APPEARANCE UR: CLEAR — SIGNIFICANT CHANGE UP
APTT BLD: 32.8 SEC — SIGNIFICANT CHANGE UP (ref 24.5–35.6)
AST SERPL-CCNC: 28 U/L — SIGNIFICANT CHANGE UP
BASOPHILS # BLD AUTO: 0.04 K/UL — SIGNIFICANT CHANGE UP (ref 0–0.2)
BASOPHILS NFR BLD AUTO: 0.6 % — SIGNIFICANT CHANGE UP (ref 0–2)
BILIRUB SERPL-MCNC: 0.5 MG/DL — SIGNIFICANT CHANGE UP (ref 0.4–2)
BILIRUB UR-MCNC: NEGATIVE — SIGNIFICANT CHANGE UP
BUN SERPL-MCNC: 9.3 MG/DL — SIGNIFICANT CHANGE UP (ref 8–20)
CALCIUM SERPL-MCNC: 9.9 MG/DL — SIGNIFICANT CHANGE UP (ref 8.4–10.5)
CHLORIDE SERPL-SCNC: 97 MMOL/L — SIGNIFICANT CHANGE UP (ref 96–108)
CO2 SERPL-SCNC: 25 MMOL/L — SIGNIFICANT CHANGE UP (ref 22–29)
COLOR SPEC: YELLOW — SIGNIFICANT CHANGE UP
CREAT SERPL-MCNC: 0.92 MG/DL — SIGNIFICANT CHANGE UP (ref 0.5–1.3)
DIFF PNL FLD: NEGATIVE — SIGNIFICANT CHANGE UP
EGFR: 105 ML/MIN/1.73M2 — SIGNIFICANT CHANGE UP
EOSINOPHIL # BLD AUTO: 0.2 K/UL — SIGNIFICANT CHANGE UP (ref 0–0.5)
EOSINOPHIL NFR BLD AUTO: 2.9 % — SIGNIFICANT CHANGE UP (ref 0–6)
GLUCOSE SERPL-MCNC: 112 MG/DL — HIGH (ref 70–99)
GLUCOSE UR QL: NEGATIVE MG/DL — SIGNIFICANT CHANGE UP
HCT VFR BLD CALC: 41.2 % — SIGNIFICANT CHANGE UP (ref 39–50)
HGB BLD-MCNC: 14.1 G/DL — SIGNIFICANT CHANGE UP (ref 13–17)
IMM GRANULOCYTES NFR BLD AUTO: 0.4 % — SIGNIFICANT CHANGE UP (ref 0–0.9)
INR BLD: 0.97 RATIO — SIGNIFICANT CHANGE UP (ref 0.85–1.18)
KETONES UR-MCNC: NEGATIVE MG/DL — SIGNIFICANT CHANGE UP
LEUKOCYTE ESTERASE UR-ACNC: NEGATIVE — SIGNIFICANT CHANGE UP
LIDOCAIN IGE QN: 213 U/L — HIGH (ref 22–51)
LYMPHOCYTES # BLD AUTO: 2.18 K/UL — SIGNIFICANT CHANGE UP (ref 1–3.3)
LYMPHOCYTES # BLD AUTO: 31.7 % — SIGNIFICANT CHANGE UP (ref 13–44)
MAGNESIUM SERPL-MCNC: 1.7 MG/DL — SIGNIFICANT CHANGE UP (ref 1.6–2.6)
MCHC RBC-ENTMCNC: 30.8 PG — SIGNIFICANT CHANGE UP (ref 27–34)
MCHC RBC-ENTMCNC: 34.2 GM/DL — SIGNIFICANT CHANGE UP (ref 32–36)
MCV RBC AUTO: 90 FL — SIGNIFICANT CHANGE UP (ref 80–100)
MONOCYTES # BLD AUTO: 0.48 K/UL — SIGNIFICANT CHANGE UP (ref 0–0.9)
MONOCYTES NFR BLD AUTO: 7 % — SIGNIFICANT CHANGE UP (ref 2–14)
NEUTROPHILS # BLD AUTO: 3.94 K/UL — SIGNIFICANT CHANGE UP (ref 1.8–7.4)
NEUTROPHILS NFR BLD AUTO: 57.4 % — SIGNIFICANT CHANGE UP (ref 43–77)
NITRITE UR-MCNC: NEGATIVE — SIGNIFICANT CHANGE UP
PH UR: 7 — SIGNIFICANT CHANGE UP (ref 5–8)
PLATELET # BLD AUTO: 262 K/UL — SIGNIFICANT CHANGE UP (ref 150–400)
POTASSIUM SERPL-MCNC: 4 MMOL/L — SIGNIFICANT CHANGE UP (ref 3.5–5.3)
POTASSIUM SERPL-SCNC: 4 MMOL/L — SIGNIFICANT CHANGE UP (ref 3.5–5.3)
PROT SERPL-MCNC: 7.1 G/DL — SIGNIFICANT CHANGE UP (ref 6.6–8.7)
PROT UR-MCNC: NEGATIVE MG/DL — SIGNIFICANT CHANGE UP
PROTHROM AB SERPL-ACNC: 10.8 SEC — SIGNIFICANT CHANGE UP (ref 9.5–13)
RBC # BLD: 4.58 M/UL — SIGNIFICANT CHANGE UP (ref 4.2–5.8)
RBC # FLD: 12.6 % — SIGNIFICANT CHANGE UP (ref 10.3–14.5)
SODIUM SERPL-SCNC: 137 MMOL/L — SIGNIFICANT CHANGE UP (ref 135–145)
SP GR SPEC: 1.02 — SIGNIFICANT CHANGE UP (ref 1–1.03)
TROPONIN T, HIGH SENSITIVITY RESULT: 6 NG/L — SIGNIFICANT CHANGE UP (ref 0–51)
TROPONIN T, HIGH SENSITIVITY RESULT: <6 NG/L — SIGNIFICANT CHANGE UP (ref 0–51)
UROBILINOGEN FLD QL: 1 MG/DL — SIGNIFICANT CHANGE UP (ref 0.2–1)
WBC # BLD: 6.87 K/UL — SIGNIFICANT CHANGE UP (ref 3.8–10.5)
WBC # FLD AUTO: 6.87 K/UL — SIGNIFICANT CHANGE UP (ref 3.8–10.5)

## 2024-07-04 PROCEDURE — 83690 ASSAY OF LIPASE: CPT

## 2024-07-04 PROCEDURE — 99285 EMERGENCY DEPT VISIT HI MDM: CPT | Mod: 25

## 2024-07-04 PROCEDURE — 99285 EMERGENCY DEPT VISIT HI MDM: CPT

## 2024-07-04 PROCEDURE — 81003 URINALYSIS AUTO W/O SCOPE: CPT

## 2024-07-04 PROCEDURE — 93005 ELECTROCARDIOGRAM TRACING: CPT

## 2024-07-04 PROCEDURE — 93010 ELECTROCARDIOGRAM REPORT: CPT

## 2024-07-04 PROCEDURE — 71045 X-RAY EXAM CHEST 1 VIEW: CPT | Mod: 26

## 2024-07-04 PROCEDURE — 71045 X-RAY EXAM CHEST 1 VIEW: CPT

## 2024-07-04 PROCEDURE — 76705 ECHO EXAM OF ABDOMEN: CPT | Mod: 26

## 2024-07-04 PROCEDURE — 85025 COMPLETE CBC W/AUTO DIFF WBC: CPT

## 2024-07-04 PROCEDURE — 85610 PROTHROMBIN TIME: CPT

## 2024-07-04 PROCEDURE — 85730 THROMBOPLASTIN TIME PARTIAL: CPT

## 2024-07-04 PROCEDURE — 83735 ASSAY OF MAGNESIUM: CPT

## 2024-07-04 PROCEDURE — 84484 ASSAY OF TROPONIN QUANT: CPT

## 2024-07-04 PROCEDURE — 80053 COMPREHEN METABOLIC PANEL: CPT

## 2024-07-04 PROCEDURE — 96374 THER/PROPH/DIAG INJ IV PUSH: CPT

## 2024-07-04 PROCEDURE — 36415 COLL VENOUS BLD VENIPUNCTURE: CPT

## 2024-07-04 PROCEDURE — 85379 FIBRIN DEGRADATION QUANT: CPT

## 2024-07-04 PROCEDURE — 76705 ECHO EXAM OF ABDOMEN: CPT

## 2024-07-04 RX ORDER — ONDANSETRON HYDROCHLORIDE 2 MG/ML
4 INJECTION INTRAMUSCULAR; INTRAVENOUS ONCE
Refills: 0 | Status: COMPLETED | OUTPATIENT
Start: 2024-07-04 | End: 2024-07-04

## 2024-07-04 RX ORDER — ACETAMINOPHEN 325 MG
1000 TABLET ORAL ONCE
Refills: 0 | Status: COMPLETED | OUTPATIENT
Start: 2024-07-04 | End: 2024-07-04

## 2024-07-04 RX ADMIN — Medication 400 MILLIGRAM(S): at 11:08

## 2024-07-04 RX ADMIN — ONDANSETRON HYDROCHLORIDE 4 MILLIGRAM(S): 2 INJECTION INTRAMUSCULAR; INTRAVENOUS at 11:08

## 2024-07-23 ENCOUNTER — NON-APPOINTMENT (OUTPATIENT)
Age: 44
End: 2024-07-23

## 2024-07-29 ENCOUNTER — APPOINTMENT (OUTPATIENT)
Dept: ORTHOPEDIC SURGERY | Facility: CLINIC | Age: 44
End: 2024-07-29
Payer: COMMERCIAL

## 2024-07-29 DIAGNOSIS — Z98.890 OTHER SPECIFIED POSTPROCEDURAL STATES: ICD-10-CM

## 2024-07-29 PROBLEM — E78.5 HYPERLIPIDEMIA, UNSPECIFIED: Chronic | Status: ACTIVE | Noted: 2024-07-04

## 2024-07-29 PROBLEM — I10 ESSENTIAL (PRIMARY) HYPERTENSION: Chronic | Status: ACTIVE | Noted: 2024-07-04

## 2024-07-29 PROCEDURE — 99024 POSTOP FOLLOW-UP VISIT: CPT

## 2024-07-29 NOTE — HISTORY OF PRESENT ILLNESS
[de-identified] : DOS: 6/18/24. S/P: Left knee arthroscopic partial medial meniscectomy, synovectomy greater than two compartments, chondroplasty of chondral defect of the medial facet of the patella.  [de-identified] : DOS: 6/18/24.  Patient is a 43-year-old male S/P: Left knee arthroscopic partial medial meniscectomy, synovectomy greater than two compartments, chondroplasty of chondral defect of the medial facet of the patella.  The patient states that overall he is doing very well and has minimal to no pain in the knee.  He has good range of motion.  Physical therapy has been going well.  He still subjectively feels little bit weak and feels he would benefit from further strengthening exercises of the knee.  He is here today for routine clinical follow-up.  Denies any fevers, chills, sweats, callus or [de-identified] : On exam, the patient is pleasant.  They  are awake, alert, and oriented x3.  The patient presents today with no assistive devices. Weight is appropriate for height Full range of motion of cervical spine without instability Full range of motion of back without instability Intact neurologic, vascular, and dermatologic exam to right and left upper extremities Intact neurologic, vascular, and dermatologic exam to right and left lower extremities  Left lower Extremity The patient's incisions are well-healed. No erythema, warmth, or drainage. There is no postoperative effusion.  The incisions are clean, dry and intact without any evidence of infection.  No bony tenderness to palpation about the knee. Range of motion: 0-130 degrees.  Negative Елена test, anterior drawer negative, Posterior Drawer negative, Lachman negative. No varus or valgus laxity at 0 or 30 degrees of knee flexion.  EHL/FHL/TA/GSC motor function is intact, sensations intact light touch in L2-S1 distributions, DP/PT pulses are palpable, compartments are soft and compressible, there is no calf tenderness to palpation bilaterally. [de-identified] : DOS: 6/18/24.  Patient is a 43-year-old male S/P: Left knee arthroscopic partial medial meniscectomy, synovectomy greater than two compartments, chondroplasty of chondral defect of the medial facet of the patella.  [de-identified] : Overall the patient is progressing well.  He has no pain or swelling on examination today.  He has excellent range of motion.  He subjectively feels little bit weak and does not feel ready to return to work.  He will complete his physical therapy program and transition into a home strengthening exercise program.  He remain out of work at this time will follow-up in 6 weeks for repeat clinical assessment.  The patient verbalizes understanding and agrees to the plan.  All questions were answered to satisfaction

## 2024-07-30 ENCOUNTER — NON-APPOINTMENT (OUTPATIENT)
Age: 44
End: 2024-07-30

## 2024-09-09 ENCOUNTER — APPOINTMENT (OUTPATIENT)
Dept: ORTHOPEDIC SURGERY | Facility: CLINIC | Age: 44
End: 2024-09-09
Payer: COMMERCIAL

## 2024-09-09 DIAGNOSIS — Z98.890 OTHER SPECIFIED POSTPROCEDURAL STATES: ICD-10-CM

## 2024-09-09 PROCEDURE — 99024 POSTOP FOLLOW-UP VISIT: CPT

## 2024-09-09 NOTE — HISTORY OF PRESENT ILLNESS
[___ Weeks Post Op] : [unfilled] weeks post op [de-identified] : DOS: 6/18/24. S/P: Left knee arthroscopic partial medial meniscectomy, synovectomy greater than two compartments, chondroplasty of chondral defect of the medial facet of the patella.  [de-identified] : DOS: 6/18/24.  Patient is a 44-year-old male S/P: Left knee arthroscopic partial medial meniscectomy, synovectomy greater than two compartments, chondroplasty of chondral defect of the medial facet of the patella.  Patient states he is doing well and has minimal to no pain and is back at work.  He is here for routine follow-up.  He denies any numbness or tingling distally.  He has no other complaints today.  He states he still has some clicking but is had this for years even prior to the surgery. [de-identified] : On exam, the patient is pleasant.  They  are awake, alert, and oriented x3.  The patient presents today with no assistive devices. Weight is appropriate for height Full range of motion of cervical spine without instability Full range of motion of back without instability Intact neurologic, vascular, and dermatologic exam to right and left upper extremities Intact neurologic, vascular, and dermatologic exam to right and left lower extremities  Left lower Extremity The patient's incisions are well-healed. No erythema, warmth, or drainage. There is no postoperative effusion.  The incisions are well-healed and benign appearance.  No bony tenderness to palpation about the knee. Range of motion: 0-130 degrees.  Negative Елена test, anterior drawer negative, Posterior Drawer negative, Lachman negative. No varus or valgus laxity at 0 or 30 degrees of knee flexion.  EHL/FHL/TA/GSC motor function is intact, sensations intact light touch in L2-S1 distributions, DP/PT pulses are palpable, compartments are soft and compressible, there is no calf tenderness to palpation bilaterally. [de-identified] : DOS: 6/18/24.  Patient is a 44-year-old male S/P: Left knee arthroscopic partial medial meniscectomy, synovectomy greater than two compartments, chondroplasty of chondral defect of the medial facet of the patella.  [de-identified] : Overall the patient is progressing well.  He has no pain or swelling on examination today.  He has excellent range of motion.  He is doing very well and has good range of motion and strength with minimal to no pain.  At this time he can gradually progress activities as tolerated will follow-up in 3 months as needed.  He will continue to work to the best of his ability with mild temporary disability.  Patient understands and agrees and all questions were answered.  Patient discussed and reviewed with Dr. Chavez today.

## 2024-11-19 ENCOUNTER — EMERGENCY (EMERGENCY)
Facility: HOSPITAL | Age: 44
LOS: 1 days | Discharge: HOME | End: 2024-11-19
Attending: EMERGENCY MEDICINE
Payer: COMMERCIAL

## 2024-11-19 VITALS
OXYGEN SATURATION: 100 % | HEIGHT: 71 IN | TEMPERATURE: 98 F | RESPIRATION RATE: 18 BRPM | SYSTOLIC BLOOD PRESSURE: 141 MMHG | HEART RATE: 65 BPM | DIASTOLIC BLOOD PRESSURE: 86 MMHG | WEIGHT: 208.34 LBS

## 2024-11-19 VITALS
RESPIRATION RATE: 19 BRPM | OXYGEN SATURATION: 100 % | SYSTOLIC BLOOD PRESSURE: 138 MMHG | DIASTOLIC BLOOD PRESSURE: 84 MMHG | HEART RATE: 72 BPM

## 2024-11-19 DIAGNOSIS — Z98.89 OTHER SPECIFIED POSTPROCEDURAL STATES: Chronic | ICD-10-CM

## 2024-11-19 PROCEDURE — 73030 X-RAY EXAM OF SHOULDER: CPT

## 2024-11-19 PROCEDURE — 73030 X-RAY EXAM OF SHOULDER: CPT | Mod: 26,RT

## 2024-11-19 PROCEDURE — 96372 THER/PROPH/DIAG INJ SC/IM: CPT

## 2024-11-19 PROCEDURE — 99284 EMERGENCY DEPT VISIT MOD MDM: CPT

## 2024-11-19 PROCEDURE — 99283 EMERGENCY DEPT VISIT LOW MDM: CPT | Mod: 25

## 2024-11-19 RX ORDER — KETOROLAC TROMETHAMINE 30 MG/ML
60 INJECTION INTRAMUSCULAR; INTRAVENOUS ONCE
Refills: 0 | Status: DISCONTINUED | OUTPATIENT
Start: 2024-11-19 | End: 2024-11-19

## 2024-11-19 RX ADMIN — KETOROLAC TROMETHAMINE 60 MILLIGRAM(S): 30 INJECTION INTRAMUSCULAR; INTRAVENOUS at 07:40

## 2024-11-19 NOTE — ED PROVIDER NOTE - CARE PROVIDER_API CALL
Alli Batres  Orthopaedic Surgery  [address] [place] [address]  Phone: [phone]  Fax: [phone]  Follow Up Time:

## 2024-11-19 NOTE — ED PROVIDER NOTE - ATTENDING APP SHARED VISIT CONTRIBUTION OF CARE
Right-hand-dominant with history of prior shoulder problems reports chronic discomfort.  Notes increasing pain over the past week with decreased range of motion since Sunday.  No acute trauma.  Unable to sleep last night secondary to pain.  No fever.  Physical exam:  Uncomfortable appearing, no acute distress, decreased range of motion right shoulder secondary to pain, no localized bony tenderness of clavicle/scapula/acromion.  X-ray: No obvious fracture.  A/P acute on chronic shoulder pain.  Sling NSAIDs and Ortho follow-up

## 2024-11-19 NOTE — ED PROVIDER NOTE - NSICDXFAMILYHX_GEN_ALL_CORE_FT
FAMILY HISTORY:  Mother  Still living? No  Family history of hypertension, Age at diagnosis: 41-50  Family history of stroke, Age at diagnosis: 51-60

## 2024-11-19 NOTE — ED PROVIDER NOTE - NSFOLLOWUPINSTRUCTIONS_ED_ALL_ED_FT
- Return to the ED for any new or worsening symptoms.   - Follow-up with orthopedics within 1-2 weeks for further evaluation  - Rest right shoulder   - May take ibuprofen 600mg every 6 hours as needed for pain

## 2024-11-19 NOTE — ED PROVIDER NOTE - PATIENT PORTAL LINK FT
You can access the FollowMyHealth Patient Portal offered by [facility] by registering at the following website: [email or web address] By joining [employer]’s FollowMyHealth portal, you will also be able to view your health information using other applications (apps) compatible with our system.

## 2024-11-19 NOTE — ED PROVIDER NOTE - CLINICAL SUMMARY MEDICAL DECISION MAKING FREE TEXT BOX
43yo M no PMHx presents to ED c/o atraumatic R shoulder pain x 4 days. States he always has some pain to R shoulder, usually when lifting weights, but past 4 days has been experiencing worsening of pain. NV intact. XR reviewed, no acute findings on my independent interpretation, patient informed they will be contacted if there is any discrepancy present on final radiology report. likely chronic overuse soft tissue injury. Given IM toradol in ED and advised pt f/u with orthopedics

## 2024-11-19 NOTE — ED ADULT NURSE NOTE - OBJECTIVE STATEMENT
Pt presents to ED because pt states since Saturday his right shoulder has been hurting him 8/10 pain from wear and tear from the gym and work. Pt denies n,v,d, chest pain/sob, respirations are even and unlabored. A&Ox3, sitting up at bedside.

## 2024-11-19 NOTE — ED PROVIDER NOTE - PHYSICAL EXAMINATION
Gen: No acute distress, non toxic  Head: NCAT  Eyes: pink conjunctivae, EOMI, PERRL  Neuro: A&O x 3, sensorimotor intact without deficits, 5/5  str b/l  MSK: No deformity. Limited active R shoulder flexion and abduction 2/2 pain. Able to passively flex shoulder to ~45 degrees. +TTP over bicipital groove. +drop arm test. no bony ttp  Vasc: Radial pulses 2+ b/l  Skin: No rashes. intact and perfused.

## 2024-11-19 NOTE — ED PROVIDER NOTE - OBJECTIVE STATEMENT
43yo M PMHx HTN, HLD presents to ED c/o R shoulder pain x 4 days. States he always has some pain to R shoulder, usually when lifting weights, but past 4 days has been experiencing worsening of pain. Took ibuprofen yesterday which helped. Reports having surgery on R shoulder several years ago, unsure what surgery. Pain is worse with movement and pt is unable to raise R arm at shoulder without pain. Pt is right hand dominant. No specific inciting incident or injury. Denies numbness, tingling, weakness, neck pain, fall, trauma.

## 2024-12-09 ENCOUNTER — APPOINTMENT (OUTPATIENT)
Dept: ORTHOPEDIC SURGERY | Facility: CLINIC | Age: 44
End: 2024-12-09